# Patient Record
Sex: FEMALE | Race: WHITE | NOT HISPANIC OR LATINO
[De-identification: names, ages, dates, MRNs, and addresses within clinical notes are randomized per-mention and may not be internally consistent; named-entity substitution may affect disease eponyms.]

---

## 2018-08-14 ENCOUNTER — APPOINTMENT (OUTPATIENT)
Dept: CARDIOLOGY | Facility: CLINIC | Age: 68
End: 2018-08-14
Payer: MEDICARE

## 2018-08-14 VITALS
HEART RATE: 64 BPM | HEIGHT: 63 IN | DIASTOLIC BLOOD PRESSURE: 84 MMHG | BODY MASS INDEX: 30.12 KG/M2 | SYSTOLIC BLOOD PRESSURE: 140 MMHG | WEIGHT: 170 LBS

## 2018-08-14 DIAGNOSIS — Z83.3 FAMILY HISTORY OF DIABETES MELLITUS: ICD-10-CM

## 2018-08-14 DIAGNOSIS — Z82.49 FAMILY HISTORY OF ISCHEMIC HEART DISEASE AND OTHER DISEASES OF THE CIRCULATORY SYSTEM: ICD-10-CM

## 2018-08-14 DIAGNOSIS — Z82.3 FAMILY HISTORY OF STROKE: ICD-10-CM

## 2018-08-14 DIAGNOSIS — Z83.49 FAMILY HISTORY OF OTHER ENDOCRINE, NUTRITIONAL AND METABOLIC DISEASES: ICD-10-CM

## 2018-08-14 PROCEDURE — 99204 OFFICE O/P NEW MOD 45 MIN: CPT

## 2018-08-16 PROCEDURE — 93224 XTRNL ECG REC UP TO 48 HRS: CPT

## 2018-08-20 ENCOUNTER — APPOINTMENT (OUTPATIENT)
Dept: CARDIOLOGY | Facility: CLINIC | Age: 68
End: 2018-08-20

## 2018-08-20 ENCOUNTER — APPOINTMENT (OUTPATIENT)
Dept: CARDIOLOGY | Facility: CLINIC | Age: 68
End: 2018-08-20
Payer: MEDICARE

## 2018-08-20 PROCEDURE — 93306 TTE W/DOPPLER COMPLETE: CPT

## 2018-09-04 ENCOUNTER — APPOINTMENT (OUTPATIENT)
Dept: CARDIOLOGY | Facility: CLINIC | Age: 68
End: 2018-09-04
Payer: MEDICARE

## 2018-09-04 ENCOUNTER — APPOINTMENT (OUTPATIENT)
Dept: CARDIOLOGY | Facility: CLINIC | Age: 68
End: 2018-09-04

## 2018-09-04 VITALS
OXYGEN SATURATION: 99 % | SYSTOLIC BLOOD PRESSURE: 140 MMHG | BODY MASS INDEX: 30.12 KG/M2 | WEIGHT: 170 LBS | HEIGHT: 63 IN | HEART RATE: 79 BPM | DIASTOLIC BLOOD PRESSURE: 66 MMHG

## 2018-09-04 PROCEDURE — 99214 OFFICE O/P EST MOD 30 MIN: CPT

## 2018-09-11 ENCOUNTER — APPOINTMENT (OUTPATIENT)
Dept: CARDIOLOGY | Facility: CLINIC | Age: 68
End: 2018-09-11
Payer: MEDICARE

## 2018-09-11 VITALS
HEART RATE: 62 BPM | HEIGHT: 63 IN | DIASTOLIC BLOOD PRESSURE: 80 MMHG | WEIGHT: 170 LBS | SYSTOLIC BLOOD PRESSURE: 138 MMHG | BODY MASS INDEX: 30.12 KG/M2

## 2018-09-11 PROCEDURE — 93015 CV STRESS TEST SUPVJ I&R: CPT

## 2018-09-11 PROCEDURE — 99214 OFFICE O/P EST MOD 30 MIN: CPT | Mod: 25

## 2018-10-08 ENCOUNTER — APPOINTMENT (OUTPATIENT)
Dept: CARDIOLOGY | Facility: CLINIC | Age: 68
End: 2018-10-08
Payer: MEDICARE

## 2018-10-08 PROCEDURE — 93351 STRESS TTE COMPLETE: CPT

## 2018-11-06 ENCOUNTER — APPOINTMENT (OUTPATIENT)
Dept: CARDIOLOGY | Facility: CLINIC | Age: 68
End: 2018-11-06
Payer: MEDICARE

## 2018-11-06 VITALS
HEART RATE: 82 BPM | SYSTOLIC BLOOD PRESSURE: 180 MMHG | BODY MASS INDEX: 30.12 KG/M2 | WEIGHT: 170 LBS | OXYGEN SATURATION: 97 % | HEIGHT: 63 IN | DIASTOLIC BLOOD PRESSURE: 84 MMHG

## 2018-11-06 PROCEDURE — 99214 OFFICE O/P EST MOD 30 MIN: CPT

## 2018-12-18 ENCOUNTER — APPOINTMENT (OUTPATIENT)
Dept: CARDIOLOGY | Facility: CLINIC | Age: 68
End: 2018-12-18
Payer: MEDICARE

## 2018-12-18 VITALS
WEIGHT: 170 LBS | BODY MASS INDEX: 30.12 KG/M2 | HEIGHT: 63 IN | OXYGEN SATURATION: 98 % | DIASTOLIC BLOOD PRESSURE: 76 MMHG | SYSTOLIC BLOOD PRESSURE: 164 MMHG | HEART RATE: 70 BPM

## 2018-12-18 PROCEDURE — 99214 OFFICE O/P EST MOD 30 MIN: CPT

## 2018-12-18 NOTE — HISTORY OF PRESENT ILLNESS
[FreeTextEntry1] : Evelina is a pleasant 68-year-old female who is slightly overweight, history of fatty liver and gallstones, recent right upper quadrant abdominal pain with cholelithiasis, HTN. She status post cholecystectomy.\par \par Her blood pressure is increased  today at rest. With ETT, it increased significantly.. She did have hypertensive urgency postoperatively in [facility] during recovery ( she believes it was secondary to inadvertent Demerol administration to which she is allergic).  The systolic blood pressure corresponds to a blood pressure machine. \par \par She is quite active and does not have exertional chest pain or shortness of breath.  History of rheumatic disease as a child? She has known about her systolic murmur for several decades\par \par No history of palpitations, dizziness or syncope. Her baseline EKG showed  PVC. Holter recording showed occasional multifocal PVCs. \par \par

## 2018-12-18 NOTE — DISCUSSION/SUMMARY
[FreeTextEntry1] : PLAN:\par Labile hypertension: On ETT , systolic blood pressure exceeded 180 mmHg. Her baseline blood pressures at rest today is elevated on  Toprol-XL 25 mg. Side effects were discussed. Keep blood pressure log; Increase Toprol to 50 mg per day.\par \par One ETT, she had equivocal ST changes. No angina. She did have shortness of breath on exertion. Stress echocardiogram Results were discussed in detail. She probably has false positive EKG changes but no ischemia. Accuracy of this by stress echocardiogram was discussed.\par \par PVCs: Mostly asymptomatic. Holter recording Showed occasional multifocal PVCs.\par \par Also PVC and arrhythmias. Asymptomatic. \par \par Fasting lipid profile - I gave her a lab slip.\par \par Sections of this transcription were entered using Voice Recognition Software. There may be an error with phonetically similar sounding words. Please call me if there are any questions re the content.\par \par \par Sincerely,\par Jorge Rich MD, FACC, [place]

## 2018-12-18 NOTE — ASSESSMENT
[FreeTextEntry1] : Review Today\par \par - EKG 08/13/18: Sinus rhythm. PVC.\par - Labs July 2018: Normal LFT. Normal creatinine. FBS 97.\par - Ultrasound abdomen July 2018: Fatty liver, and uncomplicated cholelithiasis.\par - ETT 09/11/18: Exercise 6:47 minutes on Alfredo protocol. No angina. Equivocal EKG changes.\par - Stress echocardiogram October 2018: Exercise 7:33 minutes on Alfredo protocol. There were EKG changes but stress echocardiogram was negative for ischemia by wall motion. PASP increase to 54\par

## 2018-12-18 NOTE — PHYSICAL EXAM
[General Appearance - Well Developed] : well developed [Normal Appearance] : normal appearance [Well Groomed] : well groomed [General Appearance - Well Nourished] : well nourished [No Deformities] : no deformities [General Appearance - In No Acute Distress] : no acute distress [Normal Conjunctiva] : the conjunctiva exhibited no abnormalities [Eyelids - No Xanthelasma] : the eyelids demonstrated no xanthelasmas [Normal Oral Mucosa] : normal oral mucosa [No Oral Pallor] : no oral pallor [No Oral Cyanosis] : no oral cyanosis [Respiration, Rhythm And Depth] : normal respiratory rhythm and effort [Exaggerated Use Of Accessory Muscles For Inspiration] : no accessory muscle use [Auscultation Breath Sounds / Voice Sounds] : lungs were clear to auscultation bilaterally [Heart Rate And Rhythm] : heart rate and rhythm were normal [Heart Sounds] : normal S1 and S2 [Edema] : no peripheral edema present [Abdomen Soft] : soft [Abdomen Tenderness] : non-tender [Abnormal Walk] : normal gait [Gait - Sufficient For Exercise Testing] : the gait was sufficient for exercise testing [Nail Clubbing] : no clubbing of the fingernails [Cyanosis, Localized] : no localized cyanosis [Skin Color & Pigmentation] : normal skin color and pigmentation [Skin Turgor] : normal skin turgor [] : no rash [Oriented To Time, Place, And Person] : oriented to person, place, and time [Affect] : the affect was normal [Mood] : the mood was normal [Memory Recent] : recent memory was not impaired [No Anxiety] : not feeling anxious [FreeTextEntry1] : Obese

## 2019-01-14 ENCOUNTER — RESULT REVIEW (OUTPATIENT)
Age: 69
End: 2019-01-14

## 2019-06-04 ENCOUNTER — RX RENEWAL (OUTPATIENT)
Age: 69
End: 2019-06-04

## 2019-07-09 ENCOUNTER — APPOINTMENT (OUTPATIENT)
Dept: CARDIOLOGY | Facility: CLINIC | Age: 69
End: 2019-07-09
Payer: MEDICARE

## 2019-07-09 VITALS
DIASTOLIC BLOOD PRESSURE: 70 MMHG | BODY MASS INDEX: 30.83 KG/M2 | OXYGEN SATURATION: 99 % | SYSTOLIC BLOOD PRESSURE: 134 MMHG | HEIGHT: 63 IN | HEART RATE: 70 BPM | WEIGHT: 174 LBS

## 2019-07-09 PROCEDURE — 99214 OFFICE O/P EST MOD 30 MIN: CPT

## 2019-07-09 RX ORDER — ESTRADIOL 2 MG/1
2 TABLET ORAL
Refills: 0 | Status: DISCONTINUED | COMMUNITY
End: 2019-07-09

## 2019-07-09 NOTE — DISCUSSION/SUMMARY
[FreeTextEntry1] : PLAN:\par Labile hypertension: On ETT , systolic blood pressure exceeded 180 mmHg. Her baseline blood pressures at rest today is OK.  Keep blood pressure log; Increased Toprol to 50 mg per day which she is tolerating.\par \par On  ETT, she had equivocal ST changes. No angina. She did have shortness of breath on exertion. Stress echocardiogram Results were discussed in detail. She probably has false positive EKG changes but no ischemia. Accuracy of this by stress echocardiogram was discussed.\par \par PVCs: Mostly asymptomatic. Holter recording Showed occasional multifocal PVCs.\par \par Also PVC and arrhythmias. Asymptomatic. \par \par Fasting lipid profile - Marked elevation of LDL. Start Lipitor 40 mg daily. Check LFT, CK and LDL in one month. Lab slip given to the patient. Side effects discussed. If any, she'll call us.\par \par \par \par Sincerely,\par Jorge Rich MD, FACC, [place]

## 2019-07-09 NOTE — PHYSICAL EXAM
[General Appearance - Well Developed] : well developed [Normal Appearance] : normal appearance [Well Groomed] : well groomed [General Appearance - Well Nourished] : well nourished [No Deformities] : no deformities [General Appearance - In No Acute Distress] : no acute distress [Normal Conjunctiva] : the conjunctiva exhibited no abnormalities [Eyelids - No Xanthelasma] : the eyelids demonstrated no xanthelasmas [Normal Oral Mucosa] : normal oral mucosa [No Oral Pallor] : no oral pallor [No Oral Cyanosis] : no oral cyanosis [Respiration, Rhythm And Depth] : normal respiratory rhythm and effort [Exaggerated Use Of Accessory Muscles For Inspiration] : no accessory muscle use [Auscultation Breath Sounds / Voice Sounds] : lungs were clear to auscultation bilaterally [Heart Rate And Rhythm] : heart rate and rhythm were normal [Heart Sounds] : normal S1 and S2 [Edema] : no peripheral edema present [Abdomen Soft] : soft [Abdomen Tenderness] : non-tender [Abnormal Walk] : normal gait [Gait - Sufficient For Exercise Testing] : the gait was sufficient for exercise testing [Nail Clubbing] : no clubbing of the fingernails [Cyanosis, Localized] : no localized cyanosis [Skin Color & Pigmentation] : normal skin color and pigmentation [Skin Turgor] : normal skin turgor [] : no rash [Oriented To Time, Place, And Person] : oriented to person, place, and time [Affect] : the affect was normal [Mood] : the mood was normal [No Anxiety] : not feeling anxious [Memory Recent] : recent memory was not impaired [FreeTextEntry1] : Obese

## 2019-07-09 NOTE — ASSESSMENT
[FreeTextEntry1] : Review Today\par \par - EKG 08/13/18: Sinus rhythm. PVC.\par - Labs July 2018: Normal LFT. Normal creatinine. FBS 97.\par - Ultrasound abdomen July 2018: Fatty liver, and uncomplicated cholelithiasis.\par - ETT 09/11/18: Exercise 6:47 minutes on Alfredo protocol. No angina. Equivocal EKG changes.\par - Stress echocardiogram October 2018: Exercise 7:33 minutes on Alfredo protocol. There were EKG changes but stress echocardiogram was negative for ischemia by wall motion. PASP increase to 54\par - Labs June 2019: . HDL 81. Triglycerides 168\par

## 2019-07-09 NOTE — HISTORY OF PRESENT ILLNESS
[FreeTextEntry1] : Evelina is a pleasant 68-year-old female who is slightly overweight, history of fatty liver and gallstones,  HTN. She status post cholecystectomy.\par \par Her blood pressure is OK at rest. With ETT, it increased significantly.. She did have hypertensive urgency postoperatively in [facility] during recovery ( she believes it was secondary to inadvertent Demerol administration to which she is allergic).  \par \par She is quite active and does not have exertional chest pain or shortness of breath.  History of rheumatic disease as a child? She has known about her systolic murmur for several decades\par \par No history of palpitations, dizziness or syncope. Her baseline EKG showed  PVC. Holter recording showed occasional multifocal PVCs. \par \par

## 2020-02-04 ENCOUNTER — APPOINTMENT (OUTPATIENT)
Dept: CARDIOLOGY | Facility: CLINIC | Age: 70
End: 2020-02-04
Payer: MEDICARE

## 2020-02-04 ENCOUNTER — NON-APPOINTMENT (OUTPATIENT)
Age: 70
End: 2020-02-04

## 2020-02-04 VITALS
WEIGHT: 170 LBS | DIASTOLIC BLOOD PRESSURE: 66 MMHG | SYSTOLIC BLOOD PRESSURE: 144 MMHG | HEIGHT: 63 IN | OXYGEN SATURATION: 97 % | BODY MASS INDEX: 30.12 KG/M2 | HEART RATE: 77 BPM

## 2020-02-04 DIAGNOSIS — Z87.891 PERSONAL HISTORY OF NICOTINE DEPENDENCE: ICD-10-CM

## 2020-02-04 PROCEDURE — 93000 ELECTROCARDIOGRAM COMPLETE: CPT

## 2020-02-04 PROCEDURE — 99214 OFFICE O/P EST MOD 30 MIN: CPT

## 2020-02-04 NOTE — PHYSICAL EXAM
[General Appearance - Well Developed] : well developed [Well Groomed] : well groomed [Normal Appearance] : normal appearance [General Appearance - Well Nourished] : well nourished [General Appearance - In No Acute Distress] : no acute distress [No Deformities] : no deformities [Eyelids - No Xanthelasma] : the eyelids demonstrated no xanthelasmas [Normal Oral Mucosa] : normal oral mucosa [Normal Conjunctiva] : the conjunctiva exhibited no abnormalities [No Oral Cyanosis] : no oral cyanosis [No Oral Pallor] : no oral pallor [Respiration, Rhythm And Depth] : normal respiratory rhythm and effort [Exaggerated Use Of Accessory Muscles For Inspiration] : no accessory muscle use [Heart Sounds] : normal S1 and S2 [Auscultation Breath Sounds / Voice Sounds] : lungs were clear to auscultation bilaterally [Heart Rate And Rhythm] : heart rate and rhythm were normal [Abdomen Soft] : soft [Edema] : no peripheral edema present [Abdomen Tenderness] : non-tender [Nail Clubbing] : no clubbing of the fingernails [Abnormal Walk] : normal gait [Cyanosis, Localized] : no localized cyanosis [Gait - Sufficient For Exercise Testing] : the gait was sufficient for exercise testing [Skin Turgor] : normal skin turgor [] : no rash [Skin Color & Pigmentation] : normal skin color and pigmentation [Oriented To Time, Place, And Person] : oriented to person, place, and time [Affect] : the affect was normal [No Anxiety] : not feeling anxious [Mood] : the mood was normal [Memory Recent] : recent memory was not impaired [FreeTextEntry1] : Short systolic murmur over left parastenal region

## 2020-02-04 NOTE — DISCUSSION/SUMMARY
[FreeTextEntry1] : PLAN:\par Labile hypertension: On ETT , systolic blood pressure exceeded 180 mmHg.  Keep blood pressure log; Increased Toprol to 50 mg per day which she is tolerating.  Add valsartan.  Check BMP.\par \par On  ETT, she had equivocal ST changes. No angina. She did have shortness of breath on exertion. Stress echocardiogram Results were discussed in detail. She probably has false positive EKG changes but no ischemia. Accuracy of this by stress echocardiogram was discussed.\par \par PVCs: Mostly asymptomatic. Holter recording Showed occasional multifocal PVCs.\par \par Also PVC and arrhythmias. Asymptomatic. \par \par Fasting lipid profile - Marked elevation of LDL. on Lipitor 40 mg daily.  He is tolerating Lipitor without side effects.  LDL came down to 90.\par \par Follow-up in 6 months.\par \par \par Sincerely,\par Jorge Rich MD, FACC, [place]

## 2020-02-19 ENCOUNTER — APPOINTMENT (OUTPATIENT)
Dept: RADIOLOGY | Facility: CLINIC | Age: 70
End: 2020-02-19
Payer: MEDICARE

## 2020-02-19 PROCEDURE — 74240 X-RAY XM UPR GI TRC 1CNTRST: CPT

## 2020-03-02 RX ORDER — VALSARTAN 80 MG/1
80 TABLET, COATED ORAL DAILY
Qty: 90 | Refills: 0 | Status: DISCONTINUED | COMMUNITY
Start: 2020-02-04 | End: 2020-03-02

## 2020-03-26 ENCOUNTER — OUTPATIENT (OUTPATIENT)
Dept: OUTPATIENT SERVICES | Facility: HOSPITAL | Age: 70
LOS: 1 days | End: 2020-03-26

## 2020-05-26 ENCOUNTER — RX RENEWAL (OUTPATIENT)
Age: 70
End: 2020-05-26

## 2020-07-29 ENCOUNTER — APPOINTMENT (OUTPATIENT)
Dept: MAMMOGRAPHY | Facility: CLINIC | Age: 70
End: 2020-07-29
Payer: MEDICARE

## 2020-07-29 PROCEDURE — 77067 SCR MAMMO BI INCL CAD: CPT

## 2020-07-29 PROCEDURE — 77063 BREAST TOMOSYNTHESIS BI: CPT

## 2020-08-25 ENCOUNTER — APPOINTMENT (OUTPATIENT)
Dept: CARDIOLOGY | Facility: CLINIC | Age: 70
End: 2020-08-25
Payer: MEDICARE

## 2020-08-25 VITALS
TEMPERATURE: 98.4 F | DIASTOLIC BLOOD PRESSURE: 60 MMHG | HEIGHT: 63 IN | HEART RATE: 64 BPM | SYSTOLIC BLOOD PRESSURE: 124 MMHG | WEIGHT: 170 LBS | BODY MASS INDEX: 30.12 KG/M2 | OXYGEN SATURATION: 99 %

## 2020-08-25 PROCEDURE — 99214 OFFICE O/P EST MOD 30 MIN: CPT

## 2020-08-25 RX ORDER — ASPIRIN ENTERIC COATED TABLETS 81 MG 81 MG/1
81 TABLET, DELAYED RELEASE ORAL DAILY
Qty: 90 | Refills: 3 | Status: ACTIVE | COMMUNITY
Start: 1900-01-01 | End: 1900-01-01

## 2020-08-25 NOTE — PHYSICAL EXAM
[Well Groomed] : well groomed [Normal Appearance] : normal appearance [General Appearance - Well Developed] : well developed [General Appearance - Well Nourished] : well nourished [No Deformities] : no deformities [General Appearance - In No Acute Distress] : no acute distress [Normal Conjunctiva] : the conjunctiva exhibited no abnormalities [Eyelids - No Xanthelasma] : the eyelids demonstrated no xanthelasmas [Normal Oral Mucosa] : normal oral mucosa [No Oral Cyanosis] : no oral cyanosis [No Oral Pallor] : no oral pallor [Auscultation Breath Sounds / Voice Sounds] : lungs were clear to auscultation bilaterally [Respiration, Rhythm And Depth] : normal respiratory rhythm and effort [Exaggerated Use Of Accessory Muscles For Inspiration] : no accessory muscle use [Heart Sounds] : normal S1 and S2 [Heart Rate And Rhythm] : heart rate and rhythm were normal [Murmurs] : no murmurs present [Edema] : no peripheral edema present [Abdomen Soft] : soft [Abdomen Tenderness] : non-tender [FreeTextEntry1] : Obese [Abnormal Walk] : normal gait [Gait - Sufficient For Exercise Testing] : the gait was sufficient for exercise testing [Cyanosis, Localized] : no localized cyanosis [Nail Clubbing] : no clubbing of the fingernails [Skin Color & Pigmentation] : normal skin color and pigmentation [] : no rash [Skin Turgor] : normal skin turgor [Oriented To Time, Place, And Person] : oriented to person, place, and time [Affect] : the affect was normal [Mood] : the mood was normal [No Anxiety] : not feeling anxious [Memory Recent] : recent memory was not impaired

## 2020-08-25 NOTE — DISCUSSION/SUMMARY
[FreeTextEntry1] : PLAN:\par Labile hypertension: On ETT , systolic blood pressure exceeded 180 mmHg. Her baseline blood pressures at rest today is OK.  Keep blood pressure log; Increased Toprol to 50 mg per day which she is tolerating.\par \par On  ETT, she had equivocal ST changes. No angina. She did have shortness of breath on exertion. Stress echocardiogram Results were discussed in detail. She probably has false positive EKG changes but no ischemia. Accuracy of this by stress echocardiogram was discussed.\par \par PVCs: Mostly asymptomatic. Holter recording Showed occasional multifocal PVCs.  Repeat Holter recording in future\par \par Also PVC and arrhythmias. Asymptomatic.  Evaluate PVC burden with Holter in future\par \par Fasting lipid profile -the past she had Marked elevation of LDL. Started Lipitor 40 mg daily.  LDL declined significantly to 94.  CK and LFT are normal.  No side effects.\par \par Follow up in 6 months\par \par Sincerely,\par Jorge Rich MD, FACC, [place]

## 2020-08-25 NOTE — HISTORY OF PRESENT ILLNESS
[FreeTextEntry1] : Evelina is a pleasant 69-year-old female who is overweight, history of fatty liver and gallstones,  HTN. She status post cholecystectomy.\par \par Her blood pressure is OK at rest. With ETT, it increased significantly.. She did have hypertensive urgency postoperatively in [facility] during recovery ( she believes it was secondary to inadvertent Demerol administration to which she is allergic).  \par \par She is quite active and does not have exertional chest pain or shortness of breath.  History of rheumatic disease as a child? She has known about her systolic murmur for several decades\par \par No history of palpitations, dizziness or syncope. Her baseline EKG showed  PVC. Holter recording showed occasional multifocal PVCs. \par \par

## 2020-08-25 NOTE — ASSESSMENT
[FreeTextEntry1] : Review Today\par \par - EKG 08/13/18: Sinus rhythm. PVC.\par - Labs July 2018: Normal LFT. Normal creatinine. FBS 97.\par - Ultrasound abdomen July 2018: Fatty liver, and uncomplicated cholelithiasis.\par - ETT 09/11/18: Exercise 6:47 minutes on Alfredo protocol. No angina. Equivocal EKG changes.\par - Stress echocardiogram October 2018: Exercise 7:33 minutes on Alfredo protocol. There were EKG changes but stress echocardiogram was negative for ischemia by wall motion. PASP increase to 54\par - Labs June 2019: . HDL 81. Triglycerides 168\par -Labs February 2020: LDL 94.  FBS 99.  Triglycerides 178.  Normal LFT and creatinine.  Normal CPK\par

## 2021-02-08 ENCOUNTER — APPOINTMENT (OUTPATIENT)
Dept: CARDIOLOGY | Facility: CLINIC | Age: 71
End: 2021-02-08
Payer: MEDICARE

## 2021-02-11 PROCEDURE — 93224 XTRNL ECG REC UP TO 48 HRS: CPT

## 2021-02-16 ENCOUNTER — APPOINTMENT (OUTPATIENT)
Dept: CARDIOLOGY | Facility: CLINIC | Age: 71
End: 2021-02-16
Payer: MEDICARE

## 2021-02-16 VITALS
RESPIRATION RATE: 16 BRPM | WEIGHT: 170 LBS | BODY MASS INDEX: 30.12 KG/M2 | OXYGEN SATURATION: 98 % | TEMPERATURE: 97.4 F | HEART RATE: 82 BPM | DIASTOLIC BLOOD PRESSURE: 78 MMHG | HEIGHT: 63 IN | SYSTOLIC BLOOD PRESSURE: 138 MMHG

## 2021-02-16 PROCEDURE — 99214 OFFICE O/P EST MOD 30 MIN: CPT

## 2021-02-16 RX ORDER — PANTOPRAZOLE 40 MG/1
40 TABLET, DELAYED RELEASE ORAL
Qty: 90 | Refills: 0 | Status: ACTIVE | COMMUNITY
Start: 2020-03-27

## 2021-02-16 NOTE — ASSESSMENT
[FreeTextEntry1] : Review Today\par \par - EKG 08/13/18: Sinus rhythm. PVC.\par - Labs July 2018: Normal LFT. Normal creatinine. FBS 97.\par - Ultrasound abdomen July 2018: Fatty liver, and uncomplicated cholelithiasis.\par - ETT 09/11/18: Exercise 6:47 minutes on Alfredo protocol. No angina. Equivocal EKG changes.\par - Stress echocardiogram October 2018: Exercise 7:33 minutes on Alfredo protocol. There were EKG changes but stress echocardiogram was negative for ischemia by wall motion. PASP increase to 54\par - Labs June 2019: . HDL 81. Triglycerides 168\par -Labs February 2020: LDL 94.  FBS 99.  Triglycerides 178.  Normal LFT and creatinine.  Normal CPK\par -Holter recording February 2021: Average heart rate 66.  Large amounts of artifact.  Rare PACs.  Asymptomatic ectopic atrial tachycardia 11 beats.  Rare PVCs.\par

## 2021-02-16 NOTE — PHYSICAL EXAM
[General Appearance - Well Developed] : well developed [Normal Appearance] : normal appearance [Well Groomed] : well groomed [General Appearance - Well Nourished] : well nourished [No Deformities] : no deformities [General Appearance - In No Acute Distress] : no acute distress [Normal Conjunctiva] : the conjunctiva exhibited no abnormalities [Eyelids - No Xanthelasma] : the eyelids demonstrated no xanthelasmas [Normal Oral Mucosa] : normal oral mucosa [No Oral Pallor] : no oral pallor [No Oral Cyanosis] : no oral cyanosis [Respiration, Rhythm And Depth] : normal respiratory rhythm and effort [Exaggerated Use Of Accessory Muscles For Inspiration] : no accessory muscle use [Heart Rate And Rhythm] : heart rate and rhythm were normal [Auscultation Breath Sounds / Voice Sounds] : lungs were clear to auscultation bilaterally [Heart Sounds] : normal S1 and S2 [Murmurs] : no murmurs present [Edema] : no peripheral edema present [Abdomen Soft] : soft [Abdomen Tenderness] : non-tender [FreeTextEntry1] : Obese [Abnormal Walk] : normal gait [Gait - Sufficient For Exercise Testing] : the gait was sufficient for exercise testing [Nail Clubbing] : no clubbing of the fingernails [Cyanosis, Localized] : no localized cyanosis [Skin Color & Pigmentation] : normal skin color and pigmentation [Skin Turgor] : normal skin turgor [] : no rash [Oriented To Time, Place, And Person] : oriented to person, place, and time [Affect] : the affect was normal [Mood] : the mood was normal [Memory Recent] : recent memory was not impaired [No Anxiety] : not feeling anxious

## 2021-02-16 NOTE — HISTORY OF PRESENT ILLNESS
[FreeTextEntry1] : Evelina is a pleasant 70-year-old female who is overweight, history of fatty liver and gallstones,  HTN. She status post cholecystectomy.\par \par Her blood pressure is OK at rest. With ETT, it increased significantly.. She did have hypertensive urgency postoperatively in [facility] during recovery ( she believes it was secondary to inadvertent Demerol administration to which she is allergic).  \par \par She is quite active and does not have exertional chest pain or shortness of breath.  History of rheumatic disease as a child? She has known about her systolic murmur for several decades\par \par No history of palpitations, dizziness or syncope. Her baseline EKG showed  PVC. Holter recording showed occasional multifocal PVCs. \par \par

## 2021-02-16 NOTE — DISCUSSION/SUMMARY
[FreeTextEntry1] : PLAN:\par Labile hypertension: On ETT , systolic blood pressure exceeded 180 mmHg. Her baseline blood pressures at rest today is OK.  Keep blood pressure log; continue  Toprol to 50 mg per day which she is tolerating.\par \par On  ETT, she had equivocal ST changes. No angina. She did have shortness of breath on exertion. Stress echocardiogram Results were discussed in detail. She probably has false positive EKG changes but no ischemia. Accuracy of this by stress echocardiogram was discussed.\par \par PVCs: Mostly asymptomatic. Holter recording Showed occasional multifocal PVCs.  Repeat Holter recording was limited by artifacts\par \par Fasting lipid profile -the past she had Marked elevation of LDL. Started Lipitor 40 mg daily.  LDL declined significantly to 94.  CK and LFT are normal.  No side effects.  Check fasting lipid profile.\par \par Follow up in 9 months\par \par Sincerely,\par Jorge Rich MD, FACC, [place]

## 2021-07-22 ENCOUNTER — APPOINTMENT (OUTPATIENT)
Dept: MAMMOGRAPHY | Facility: CLINIC | Age: 71
End: 2021-07-22
Payer: MEDICARE

## 2021-07-22 PROCEDURE — 77063 BREAST TOMOSYNTHESIS BI: CPT

## 2021-07-22 PROCEDURE — 77067 SCR MAMMO BI INCL CAD: CPT

## 2021-07-23 ENCOUNTER — APPOINTMENT (OUTPATIENT)
Dept: RADIOLOGY | Facility: CLINIC | Age: 71
End: 2021-07-23
Payer: MEDICARE

## 2021-07-23 PROCEDURE — 77080 DXA BONE DENSITY AXIAL: CPT

## 2021-08-12 ENCOUNTER — APPOINTMENT (OUTPATIENT)
Dept: CARDIOLOGY | Facility: CLINIC | Age: 71
End: 2021-08-12
Payer: MEDICARE

## 2021-08-12 ENCOUNTER — NON-APPOINTMENT (OUTPATIENT)
Age: 71
End: 2021-08-12

## 2021-08-12 VITALS
SYSTOLIC BLOOD PRESSURE: 122 MMHG | TEMPERATURE: 97.8 F | WEIGHT: 170 LBS | HEART RATE: 61 BPM | BODY MASS INDEX: 30.11 KG/M2 | OXYGEN SATURATION: 98 % | DIASTOLIC BLOOD PRESSURE: 66 MMHG | RESPIRATION RATE: 15 BRPM

## 2021-08-12 PROCEDURE — 93000 ELECTROCARDIOGRAM COMPLETE: CPT | Mod: NC

## 2021-08-12 PROCEDURE — 99214 OFFICE O/P EST MOD 30 MIN: CPT

## 2021-08-12 NOTE — DISCUSSION/SUMMARY
[FreeTextEntry1] : PLAN:\par Labile hypertension:  Well controlled in office.  Continue current meds. \par \par Hyperlipidemia: Tolerating statin therapy.  LDL: 94 on labs 3/21.  Continue. \par \par Preop:\par At present, there are no active cardiac conditions. \par No recent unstable coronary syndromes, decompensated heart failure, severe valvular heart disease or significant dysrhythmias. \par Baseline functional status is acceptable. \par The clinical benefit of the proposed procedure outweighs the associated cardiovascular risk. \par Risk not attenuated with further CV testing. \par Prior testing as outlined above.\par Optimized from a cardiovascular perspective. \par Pt had episode of hypertensive urgency with Demerol administration.  Would avoid. \par \par Follow up with Dr. Rich as scheduled.

## 2021-08-12 NOTE — ASSESSMENT
[FreeTextEntry1] : Reviewed previously\par \par - EKG 08/13/18: Sinus rhythm. PVC.\par - Labs July 2018: Normal LFT. Normal creatinine. FBS 97.\par - Ultrasound abdomen July 2018: Fatty liver, and uncomplicated cholelithiasis.\par - ETT 09/11/18: Exercise 6:47 minutes on Alfredo protocol. No angina. Equivocal EKG changes.\par - Stress echocardiogram October 2018: Exercise 7:33 minutes on Alfredo protocol. There were EKG changes but stress echocardiogram was negative for ischemia by wall motion. PASP increase to 54\par - Labs June 2019: . HDL 81. Triglycerides 168\par -Labs February 2020: LDL 94.  FBS 99.  Triglycerides 178.  Normal LFT and creatinine.  Normal CPK\par -Holter recording February 2021: Average heart rate 66.  Large amounts of artifact.  Rare PACs.  Asymptomatic ectopic atrial tachycardia 11 beats.  Rare PVCs.\par

## 2021-08-12 NOTE — HISTORY OF PRESENT ILLNESS
[FreeTextEntry1] : Evelina is a pleasant 70-year-old female who is overweight, history of fatty liver and gallstones,  HTN. She status post cholecystectomy.\par \par Her blood pressure is OK at rest. With ETT, it increased significantly.. She did have hypertensive urgency postoperatively in [facility] during recovery ( she believes it was secondary to inadvertent Demerol administration to which she is allergic).  \par \par She is quite active and does not have exertional chest pain or shortness of breath.  History of rheumatic disease as a child? She has known about her systolic murmur for several decades\par \par No history of palpitations, dizziness or syncope. Her baseline EKG showed  PVC. Holter recording showed occasional multifocal PVCs. \par \par She presents requesting preop clearance for the procedure as noted above.  EKG: NSR.  \par BP on my exam 114/58mmHg. \par \par

## 2021-08-12 NOTE — REASON FOR VISIT
[Follow-Up - Clinic] : a clinic follow-up of [FreeTextEntry2] : Preop evaluation for b/l cataract surgery with Dr. Morillo at [facility] on 8/26/21 and 9/9/21

## 2021-08-12 NOTE — PHYSICAL EXAM
[General Appearance - Well Developed] : well developed [Normal Appearance] : normal appearance [Well Groomed] : well groomed [General Appearance - Well Nourished] : well nourished [No Deformities] : no deformities [General Appearance - In No Acute Distress] : no acute distress [Normal Conjunctiva] : the conjunctiva exhibited no abnormalities [Eyelids - No Xanthelasma] : the eyelids demonstrated no xanthelasmas [Normal Oral Mucosa] : normal oral mucosa [No Oral Pallor] : no oral pallor [No Oral Cyanosis] : no oral cyanosis [Respiration, Rhythm And Depth] : normal respiratory rhythm and effort [Exaggerated Use Of Accessory Muscles For Inspiration] : no accessory muscle use [Auscultation Breath Sounds / Voice Sounds] : lungs were clear to auscultation bilaterally [Heart Rate And Rhythm] : heart rate and rhythm were normal [Heart Sounds] : normal S1 and S2 [Murmurs] : no murmurs present [Edema] : no peripheral edema present [Abdomen Soft] : soft [Abdomen Tenderness] : non-tender [Abnormal Walk] : normal gait [Gait - Sufficient For Exercise Testing] : the gait was sufficient for exercise testing [Nail Clubbing] : no clubbing of the fingernails [Cyanosis, Localized] : no localized cyanosis [Skin Color & Pigmentation] : normal skin color and pigmentation [Skin Turgor] : normal skin turgor [] : no rash [Oriented To Time, Place, And Person] : oriented to person, place, and time [Affect] : the affect was normal [Mood] : the mood was normal [Memory Recent] : recent memory was not impaired [No Anxiety] : not feeling anxious [FreeTextEntry1] : Obese

## 2021-11-29 ENCOUNTER — APPOINTMENT (OUTPATIENT)
Dept: CARDIOLOGY | Facility: CLINIC | Age: 71
End: 2021-11-29
Payer: MEDICARE

## 2021-11-29 VITALS
WEIGHT: 170 LBS | HEIGHT: 63 IN | HEART RATE: 69 BPM | DIASTOLIC BLOOD PRESSURE: 62 MMHG | BODY MASS INDEX: 30.12 KG/M2 | TEMPERATURE: 96.6 F | OXYGEN SATURATION: 99 % | SYSTOLIC BLOOD PRESSURE: 128 MMHG

## 2021-11-29 PROCEDURE — 99213 OFFICE O/P EST LOW 20 MIN: CPT

## 2021-11-29 NOTE — DISCUSSION/SUMMARY
[FreeTextEntry1] : PLAN:\par Labile hypertension:  Well controlled in office.  Continue current meds. \par \par Hyperlipidemia: Tolerating statin therapy.  LDL: 94 on labs 3/21.  Continue Lipitor. \par \par Avoid Demerol in future (suspect hypertensive blood pressure response to it)\par \par Exercise and weight loss recommended\par \par Follow-up in 6 months

## 2021-11-29 NOTE — REASON FOR VISIT
[Follow-Up - Clinic] : a clinic follow-up of [FreeTextEntry2] : Preop evaluation for b/l cataract surgery with Dr. Morillo at [facility] on 8/26/21 and 9/9/21

## 2021-11-29 NOTE — HISTORY OF PRESENT ILLNESS
[FreeTextEntry1] : Evelina is a pleasant 71-year-old female who is overweight, history of fatty liver and gallstones,  HTN. She status post cholecystectomy.\par \par Her blood pressure is controlled. She did have hypertensive urgency postoperatively in [facility] during recovery ( she believes it was secondary to inadvertent Demerol administration to which she is allergic).  \par \par She is quite active and does not have exertional chest pain or shortness of breath.  History of rheumatic disease as a child? She has known about her systolic murmur for several decades\par \par No history of palpitations, dizziness or syncope. Her baseline EKG showed  PVC. Holter recording showed occasional multifocal PVCs. \par \par \par

## 2021-11-29 NOTE — ASSESSMENT
[FreeTextEntry1] : Reviewed previously\par \par - EKG 08/13/18: Sinus rhythm. PVC.\par - Labs July 2018: Normal LFT. Normal creatinine. FBS 97.\par - Ultrasound abdomen July 2018: Fatty liver, and uncomplicated cholelithiasis.\par - ETT 09/11/18: Exercise 6:47 minutes on Alfredo protocol. No angina. Equivocal EKG changes.\par - Stress echocardiogram October 2018: Exercise 7:33 minutes on Alfredo protocol. There were EKG changes but stress echocardiogram was negative for ischemia by wall motion. PASP increase to 54\par - Labs June 2019: . HDL 81. Triglycerides 168\par - Labs February 2020: LDL 94.  FBS 99.  Triglycerides 178.  Normal LFT and creatinine.  Normal CPK\par - Holter recording February 2021: Average heart rate 66.  Large amounts of artifact.  Rare PACs.  Asymptomatic ectopic atrial tachycardia 11 beats.  Rare PVCs.\par

## 2021-11-29 NOTE — PHYSICAL EXAM
[General Appearance - Well Developed] : well developed [Normal Appearance] : normal appearance [Well Groomed] : well groomed [General Appearance - Well Nourished] : well nourished [No Deformities] : no deformities [General Appearance - In No Acute Distress] : no acute distress [Normal Conjunctiva] : the conjunctiva exhibited no abnormalities [Eyelids - No Xanthelasma] : the eyelids demonstrated no xanthelasmas [Normal Oral Mucosa] : normal oral mucosa [No Oral Pallor] : no oral pallor [No Oral Cyanosis] : no oral cyanosis [Respiration, Rhythm And Depth] : normal respiratory rhythm and effort [Exaggerated Use Of Accessory Muscles For Inspiration] : no accessory muscle use [Auscultation Breath Sounds / Voice Sounds] : lungs were clear to auscultation bilaterally [Heart Rate And Rhythm] : heart rate and rhythm were normal [Heart Sounds] : normal S1 and S2 [Murmurs] : no murmurs present [Edema] : no peripheral edema present [Abdomen Soft] : soft [Abdomen Tenderness] : non-tender [FreeTextEntry1] : Obese [Abnormal Walk] : normal gait [Gait - Sufficient For Exercise Testing] : the gait was sufficient for exercise testing [Nail Clubbing] : no clubbing of the fingernails [Cyanosis, Localized] : no localized cyanosis [Skin Color & Pigmentation] : normal skin color and pigmentation [Skin Turgor] : normal skin turgor [] : no rash [Oriented To Time, Place, And Person] : oriented to person, place, and time [Affect] : the affect was normal [Mood] : the mood was normal [Memory Recent] : recent memory was not impaired [No Anxiety] : not feeling anxious

## 2022-06-14 ENCOUNTER — APPOINTMENT (OUTPATIENT)
Dept: CARDIOLOGY | Facility: CLINIC | Age: 72
End: 2022-06-14
Payer: MEDICARE

## 2022-06-14 VITALS
WEIGHT: 170 LBS | RESPIRATION RATE: 14 BRPM | BODY MASS INDEX: 30.12 KG/M2 | SYSTOLIC BLOOD PRESSURE: 136 MMHG | HEIGHT: 63 IN | HEART RATE: 63 BPM | TEMPERATURE: 96.8 F | OXYGEN SATURATION: 98 % | DIASTOLIC BLOOD PRESSURE: 68 MMHG

## 2022-06-14 PROCEDURE — 99214 OFFICE O/P EST MOD 30 MIN: CPT

## 2022-06-14 RX ORDER — OFLOXACIN 3 MG/ML
0.3 SOLUTION/ DROPS OPHTHALMIC
Qty: 5 | Refills: 0 | Status: DISCONTINUED | COMMUNITY
Start: 2021-09-02 | End: 2022-06-14

## 2022-06-14 RX ORDER — PREDNISOLONE ACETATE 10 MG/ML
1 SUSPENSION/ DROPS OPHTHALMIC
Qty: 5 | Refills: 0 | Status: DISCONTINUED | COMMUNITY
Start: 2021-08-09 | End: 2022-06-14

## 2022-06-14 NOTE — DISCUSSION/SUMMARY
[FreeTextEntry1] : PLAN:\par Labile hypertension: Not ideally controlled today.  Increase Benicar to 1.5 tablets/day.\par \par Hyperlipidemia: Tolerating statin therapy.  LDL: 94 on labs 3/21.  Continue Lipitor.  Follow-up fasting lipid profile with primary care during next routine labs.\par \par Avoid Demerol in future (suspect hypertensive blood pressure response to it)\par \par Exercise and weight loss recommended\par \par Echocardiogram: Look for hypertensive changes.  \par \par Follow-up in 6 months

## 2022-06-14 NOTE — HISTORY OF PRESENT ILLNESS
[FreeTextEntry1] : Evelina is a pleasant 71-year-old female who is overweight, history of fatty liver and gallstones,  HTN. She status post cholecystectomy.\par \par Her blood pressure is borderline controlled. She did have hypertensive urgency postoperatively in [facility] during recovery ( she believes it was secondary to inadvertent Demerol administration to which she is allergic).  \par \par She is quite active and does not have exertional chest pain or shortness of breath.  History of rheumatic disease as a child? She has known about her systolic murmur for several decades.  ZIO in August 2018 showed mild MR and AR, mild TR with normal PASP and EF.\par \par No history of palpitations, dizziness or syncope. Her baseline EKG showed  PVC. Holter recording showed occasional multifocal PVCs. \par \par \par

## 2022-07-11 ENCOUNTER — NON-APPOINTMENT (OUTPATIENT)
Age: 72
End: 2022-07-11

## 2022-08-26 ENCOUNTER — NON-APPOINTMENT (OUTPATIENT)
Age: 72
End: 2022-08-26

## 2022-08-26 DIAGNOSIS — Z92.89 PERSONAL HISTORY OF OTHER MEDICAL TREATMENT: ICD-10-CM

## 2022-08-26 DIAGNOSIS — Z86.018 PERSONAL HISTORY OF OTHER BENIGN NEOPLASM: ICD-10-CM

## 2022-08-26 DIAGNOSIS — Z78.9 OTHER SPECIFIED HEALTH STATUS: ICD-10-CM

## 2022-08-26 DIAGNOSIS — Z92.29 PERSONAL HISTORY OF OTHER DRUG THERAPY: ICD-10-CM

## 2022-08-26 DIAGNOSIS — Z84.1 FAMILY HISTORY OF DISORDERS OF KIDNEY AND URETER: ICD-10-CM

## 2022-08-26 DIAGNOSIS — Z98.891 HISTORY OF UTERINE SCAR FROM PREVIOUS SURGERY: ICD-10-CM

## 2022-08-26 DIAGNOSIS — Z78.0 ASYMPTOMATIC MENOPAUSAL STATE: ICD-10-CM

## 2022-08-26 DIAGNOSIS — Z23 ENCOUNTER FOR IMMUNIZATION: ICD-10-CM

## 2022-08-26 DIAGNOSIS — Z87.42 PERSONAL HISTORY OF OTHER DISEASES OF THE FEMALE GENITAL TRACT: ICD-10-CM

## 2022-08-26 DIAGNOSIS — Z98.890 OTHER SPECIFIED POSTPROCEDURAL STATES: ICD-10-CM

## 2022-09-16 DIAGNOSIS — N95.2 POSTMENOPAUSAL ATROPHIC VAGINITIS: ICD-10-CM

## 2022-09-21 ENCOUNTER — RESULT CHARGE (OUTPATIENT)
Age: 72
End: 2022-09-21

## 2022-09-21 ENCOUNTER — APPOINTMENT (OUTPATIENT)
Dept: OBGYN | Facility: CLINIC | Age: 72
End: 2022-09-21

## 2022-09-21 ENCOUNTER — LABORATORY RESULT (OUTPATIENT)
Age: 72
End: 2022-09-21

## 2022-09-21 VITALS — WEIGHT: 170 LBS | DIASTOLIC BLOOD PRESSURE: 78 MMHG | BODY MASS INDEX: 30.11 KG/M2 | SYSTOLIC BLOOD PRESSURE: 140 MMHG

## 2022-09-21 DIAGNOSIS — R82.998 OTHER ABNORMAL FINDINGS IN URINE: ICD-10-CM

## 2022-09-21 LAB
BILIRUB UR QL STRIP: NORMAL
CLARITY UR: CLEAR
COLLECTION METHOD: NORMAL
GLUCOSE UR-MCNC: NORMAL
HCG UR QL: 0.2 EU/DL
HEMOGLOBIN: 12.1
HGB UR QL STRIP.AUTO: NORMAL
KETONES UR-MCNC: NORMAL
LEUKOCYTE ESTERASE UR QL STRIP: NORMAL
NITRITE UR QL STRIP: NORMAL
PH UR STRIP: 5.5
PROT UR STRIP-MCNC: NORMAL
SP GR UR STRIP: 1.01

## 2022-09-21 PROCEDURE — 85018 HEMOGLOBIN: CPT | Mod: QW

## 2022-09-21 PROCEDURE — G0101: CPT

## 2022-09-21 PROCEDURE — 82270 OCCULT BLOOD FECES: CPT

## 2022-09-21 PROCEDURE — 81003 URINALYSIS AUTO W/O SCOPE: CPT | Mod: QW

## 2022-09-21 PROCEDURE — 99212 OFFICE O/P EST SF 10 MIN: CPT | Mod: 25

## 2022-09-21 NOTE — PHYSICAL EXAM
[Chaperone Present] : A chaperone was present in the examining room during all aspects of the physical examination [Appropriately responsive] : appropriately responsive [Alert] : alert [No Lymphadenopathy] : no lymphadenopathy [Soft] : soft [Non-tender] : non-tender [Oriented x3] : oriented x3 [Examination Of The Breasts] : a normal appearance [No Discharge] : no discharge [No Masses] : no breast masses were palpable [Labia Majora] : normal [Labia Minora] : normal [Normal] : normal [Atrophy] : atrophy [No Bleeding] : There was no active vaginal bleeding [Absent] : absent [Uterine Adnexae] : normal [FreeTextEntry1] : Sadie CABRERA-SHAHRAM chaperoned during entire physical exam [Occult Blood Positive] : was negative for occult blood analysis

## 2022-09-21 NOTE — HISTORY OF PRESENT ILLNESS
[TextBox_4] : Patient is a 72y/o female here today for annual visit. Patient is s/p hysterectomy. On estradiol tolerating well, will continue at this time.

## 2022-09-21 NOTE — DISCUSSION/SUMMARY
[FreeTextEntry1] : Patient to follow up in 1 year for annual GYN exam\par Mammogram: now Rx given\par colonoscopy: now her apt is scheduled for next month\par Bone density due: 7/26\par \par Continue estradiol 1mg, Rx sent \par \par Pap ordered\par Hemoccult ordered \par All questions answered, patient agreeable with plan.\par

## 2022-09-21 NOTE — PLAN
[FreeTextEntry1] : Patient to follow up in 1 year for annual GYN exam\par Mammogram and bilateral breast US due:\par Colonoscopy due: \par Bone density due: 7/26\par Pap ordered\par Gc Chlamydia ordered\par Hemoccult ordered \par All questions answered, patient agreeable with plan.\par

## 2022-09-27 ENCOUNTER — NON-APPOINTMENT (OUTPATIENT)
Age: 72
End: 2022-09-27

## 2022-09-27 LAB
APPEARANCE: ABNORMAL
BACTERIA UR CULT: ABNORMAL
BILIRUBIN URINE: NEGATIVE
BLOOD URINE: NORMAL
COLOR: NORMAL
GLUCOSE QUALITATIVE U: NEGATIVE
KETONES URINE: NEGATIVE
LEUKOCYTE ESTERASE URINE: ABNORMAL
NITRITE URINE: NEGATIVE
PH URINE: 6
PROTEIN URINE: NEGATIVE
SPECIFIC GRAVITY URINE: 1.02
UROBILINOGEN URINE: NORMAL

## 2022-09-29 LAB — CYTOLOGY CVX/VAG DOC THIN PREP: NORMAL

## 2022-10-03 ENCOUNTER — RESULT CHARGE (OUTPATIENT)
Age: 72
End: 2022-10-03

## 2022-10-04 ENCOUNTER — APPOINTMENT (OUTPATIENT)
Dept: CARDIOLOGY | Facility: CLINIC | Age: 72
End: 2022-10-04

## 2022-10-04 ENCOUNTER — NON-APPOINTMENT (OUTPATIENT)
Age: 72
End: 2022-10-04

## 2022-10-04 VITALS — SYSTOLIC BLOOD PRESSURE: 146 MMHG | DIASTOLIC BLOOD PRESSURE: 76 MMHG

## 2022-10-04 VITALS
SYSTOLIC BLOOD PRESSURE: 144 MMHG | HEIGHT: 63 IN | HEART RATE: 59 BPM | WEIGHT: 170 LBS | OXYGEN SATURATION: 98 % | DIASTOLIC BLOOD PRESSURE: 70 MMHG | TEMPERATURE: 96.9 F | BODY MASS INDEX: 30.12 KG/M2 | RESPIRATION RATE: 14 BRPM

## 2022-10-04 PROCEDURE — 99214 OFFICE O/P EST MOD 30 MIN: CPT

## 2022-10-04 PROCEDURE — 93000 ELECTROCARDIOGRAM COMPLETE: CPT

## 2022-10-04 PROCEDURE — 93306 TTE W/DOPPLER COMPLETE: CPT

## 2022-10-04 NOTE — DISCUSSION/SUMMARY
[FreeTextEntry1] : CHRISTAL NGUYEN is a 72 year old F who presents today with the above history and the following active issues: \par \par Labile hypertension:  Slightly elevated in office today 146/76. Continue current meds. Home Bp readings lower. She will RTO with her BP cuff to check accuracy. \par Echo showing HTN changes with Mild LVH and Mod dilated LA. \par \par Hyperlipidemia: Tolerating statin therapy.  LDL: 92 From Sept 2022. \par -Trig 195- she would benefit from starting Fish oil supp\par - dietary changes discussed\par \par Avoid Demerol in future (suspect hypertensive blood pressure response to it)\par \par Exercise and weight loss recommended\par \par RTO for BP check then routinely in 6 months\par Routine 6 month follow up or sooner if needed \par Discussed red flag symptoms, which would warrant sooner or emergent medical evaluation.\par Any questions and concerns were addressed and resolved. \par \par Sincerely,\par \par Margarita Joiner ANP-C\par Patients history, testing, and plan reviewed with supervising MD: Dr. Messi Hahn \par  [EKG obtained to assist in diagnosis and management of assessed problem(s)] : EKG obtained to assist in diagnosis and management of assessed problem(s)

## 2022-10-04 NOTE — HISTORY OF PRESENT ILLNESS
[FreeTextEntry1] : Evelina is a pleasant 72-year-old female who is overweight, history of fatty liver and gallstones, HTN, HLD. She status post cholecystectomy.\par \par Today she presents to review her recent testing (Echo and labs). She is feeling well. She has been compliant with taking her BP medications. BP readings at home 100-110's mostly. However today she is 146/74. She denies chest pain, pressure, palpitations, LE edema, lightheadedness, dizziness, near syncope or syncope. \par \par She did have hypertensive urgency postoperatively in [facility] during recovery ( she believes it was secondary to inadvertent Demerol administration to which she is allergic).  \par \par She is quite active and does not have exertional chest pain.  History of rheumatic disease as a child? She has known about her systolic murmur for several decades

## 2022-10-04 NOTE — ASSESSMENT
[FreeTextEntry1] : Reviewed today:\par EKG 10/4/2022 SR 60bpm PRWP LAD Non-specific T wave abnormality\par Echo 10/4/2022: Preliminary report. EF >65%, Mild MR. Calcified aortic and mitral valve with normal opening. Mild AR. Moderately dilated LA. Hyperdynamic LV systolic function. Mild LVH. Mild MD/TR. Borderline Pulm pressures 32mmHg\par \par Labs: Total Chol 195 Trig 195 LDL 92 Creat 0.89 K+ 4.1 ast 16 Alt 11  Hgb 12.5 Hct 37.4 Plt 257\par Labs: 11/2021 Hgb 12.8 HCT 37.8 Plt 253 Creat 0.81 K+ 3.9 \par \par Reviewed previously\par - EKG 08/13/18: Sinus rhythm. PVC.\par - Labs July 2018: Normal LFT. Normal creatinine. FBS 97.\par - Ultrasound abdomen July 2018: Fatty liver, and uncomplicated cholelithiasis.\par - ETT 09/11/18: Exercise 6:47 minutes on Alfredo protocol. No angina. Equivocal EKG changes.\par - Stress echocardiogram October 2018: Exercise 7:33 minutes on Alfredo protocol. There were EKG changes but stress echocardiogram was negative for ischemia by wall motion. PASP increase to 54\par - Labs June 2019: . HDL 81. Triglycerides 168\par - Labs February 2020: LDL 94.  FBS 99.  Triglycerides 178.  Normal LFT and creatinine.  Normal CPK\par - Holter recording February 2021: Average heart rate 66.  Large amounts of artifact.  Rare PACs.  Asymptomatic ectopic atrial tachycardia 11 beats.  Rare PVCs.\par

## 2022-10-04 NOTE — PHYSICAL EXAM
[Normal] : clear lung fields, good air entry, no respiratory distress [Normal Gait] : normal gait [No Edema] : no edema [No Rash] : no rash [Alert and Oriented] : alert and oriented [Normal Speech] : normal speech [de-identified] : obese [de-identified] : 1/6 Murmur

## 2022-10-14 ENCOUNTER — APPOINTMENT (OUTPATIENT)
Dept: MAMMOGRAPHY | Facility: CLINIC | Age: 72
End: 2022-10-14

## 2022-10-14 ENCOUNTER — RESULT REVIEW (OUTPATIENT)
Age: 72
End: 2022-10-14

## 2022-10-14 PROCEDURE — 77067 SCR MAMMO BI INCL CAD: CPT

## 2022-10-14 PROCEDURE — 77063 BREAST TOMOSYNTHESIS BI: CPT

## 2022-10-17 ENCOUNTER — NON-APPOINTMENT (OUTPATIENT)
Age: 72
End: 2022-10-17

## 2022-10-19 ENCOUNTER — APPOINTMENT (OUTPATIENT)
Dept: CARDIOLOGY | Facility: CLINIC | Age: 72
End: 2022-10-19

## 2022-10-25 ENCOUNTER — APPOINTMENT (OUTPATIENT)
Dept: CARDIOLOGY | Facility: CLINIC | Age: 72
End: 2022-10-25

## 2022-10-25 ENCOUNTER — NON-APPOINTMENT (OUTPATIENT)
Age: 72
End: 2022-10-25

## 2022-11-08 ENCOUNTER — NON-APPOINTMENT (OUTPATIENT)
Age: 72
End: 2022-11-08

## 2022-12-19 ENCOUNTER — NON-APPOINTMENT (OUTPATIENT)
Age: 72
End: 2022-12-19

## 2023-04-03 ENCOUNTER — APPOINTMENT (OUTPATIENT)
Dept: CARDIOLOGY | Facility: CLINIC | Age: 73
End: 2023-04-03
Payer: MEDICARE

## 2023-04-03 VITALS
BODY MASS INDEX: 30.12 KG/M2 | HEART RATE: 72 BPM | OXYGEN SATURATION: 98 % | SYSTOLIC BLOOD PRESSURE: 136 MMHG | HEIGHT: 63 IN | DIASTOLIC BLOOD PRESSURE: 70 MMHG | WEIGHT: 170 LBS

## 2023-04-03 DIAGNOSIS — R94.39 ABNORMAL RESULT OF OTHER CARDIOVASCULAR FUNCTION STUDY: ICD-10-CM

## 2023-04-03 PROCEDURE — 99214 OFFICE O/P EST MOD 30 MIN: CPT

## 2023-04-03 NOTE — ASSESSMENT
[FreeTextEntry1] : Reviewed today:\par EKG 10/4/2022 SR 60bpm PRWP LAD Non-specific T wave abnormality\par Echo 10/4/2022: Preliminary report. EF >65%, Mild MR. Calcified aortic and mitral valve with normal opening. Mild AR. Moderately dilated LA. Hyperdynamic LV systolic function. Mild LVH. Mild NH/TR. Borderline Pulm pressures 32mmHg\par Labs in September 2022: Total Chol 195 Trig 195 LDL 92 Creat 0.89 K+ 4.1 ast 16 Alt 11  Hgb 12.5 Hct 37.4 Plt 257\par \par \par Reviewed previously\par - EKG 08/13/18: Sinus rhythm. PVC.\par - Labs July 2018: Normal LFT. Normal creatinine. FBS 97.\par - Ultrasound abdomen July 2018: Fatty liver, and uncomplicated cholelithiasis.\par - ETT 09/11/18: Exercise 6:47 minutes on Alfredo protocol. No angina. Equivocal EKG changes.\par - Stress echocardiogram October 2018: Exercise 7:33 minutes on Alfredo protocol. There were EKG changes but stress echocardiogram was negative for ischemia by wall motion. PASP increase to 54\par - Labs June 2019: . HDL 81. Triglycerides 168\par - Labs February 2020: LDL 94.  FBS 99.  Triglycerides 178.  Normal LFT and creatinine.  Normal CPK\par - Holter recording February 2021: Average heart rate 66.  Large amounts of artifact.  Rare PACs.  Asymptomatic ectopic atrial tachycardia 11 beats.  Rare PVCs.\par

## 2023-04-03 NOTE — PHYSICAL EXAM
[Normal] : clear lung fields, good air entry, no respiratory distress [Normal Gait] : normal gait [No Edema] : no edema [No Rash] : no rash [Normal Speech] : normal speech [Alert and Oriented] : alert and oriented [de-identified] : obese [de-identified] : 1/6 Murmur

## 2023-04-03 NOTE — DISCUSSION/SUMMARY
[FreeTextEntry1] : CHRISTAL NGUYEN is a 72 year old F who presents today with the above history and the following active issues: \par \par Labile hypertension:  Slightly elevated in office today 136/7. Continue current meds. Home Bp readings lower. \par \par Echo showing HTN changes with Mild LVH and Mod dilated LA. \par \par Hyperlipidemia: Tolerating statin therapy.  LDL: 92 From Sept 2022. \par -Trig 195- she would benefit from starting Fish oil supp\par - dietary changes discussed\par -Labs in 6 months\par \par Avoid Demerol in future (suspect hypertensive blood pressure response to it)\par \par Exercise and weight loss recommended\par \par Thank you for this referral and allowing me to participate in the care of this patient.  If I can be of any further help or  if you have any questions, please do not hesitate to contact me\par \par \par Sincerely,\par \par Jorge Rich MD, FACC, [place]\par \par

## 2023-04-03 NOTE — HISTORY OF PRESENT ILLNESS
[FreeTextEntry1] : Evelina is a pleasant 72-year-old female who is overweight, history of fatty liver and gallstones, HTN, HLD. She status post cholecystectomy.\par \par She is feeling well. She has been compliant with taking her BP medications. BP readings at home 100-110's mostly. However today she is 146/74. She denies chest pain, pressure, palpitations, LE edema, lightheadedness, dizziness, near syncope or syncope. \par \par She did have hypertensive urgency postoperatively in [facility] during recovery ( she believes it was secondary to inadvertent Demerol administration to which she is allergic).  \par \par She is quite active and does not have exertional chest pain.  History of rheumatic disease as a child? She has known about her systolic murmur for several decades; it has not changed and she is asymptomatic

## 2023-09-26 ENCOUNTER — APPOINTMENT (OUTPATIENT)
Dept: OBGYN | Facility: CLINIC | Age: 73
End: 2023-09-26
Payer: MEDICARE

## 2023-09-26 ENCOUNTER — RESULT CHARGE (OUTPATIENT)
Age: 73
End: 2023-09-26

## 2023-09-26 VITALS
HEART RATE: 67 BPM | DIASTOLIC BLOOD PRESSURE: 80 MMHG | WEIGHT: 165 LBS | SYSTOLIC BLOOD PRESSURE: 175 MMHG | HEIGHT: 62.5 IN | BODY MASS INDEX: 29.6 KG/M2

## 2023-09-26 DIAGNOSIS — Z00.00 ENCOUNTER FOR GENERAL ADULT MEDICAL EXAMINATION W/OUT ABNORMAL FINDINGS: ICD-10-CM

## 2023-09-26 LAB
BILIRUB UR QL STRIP: NORMAL
CLARITY UR: CLEAR
COLLECTION METHOD: NORMAL
GLUCOSE UR-MCNC: NORMAL
HCG UR QL: 0 EU/DL
HEMOGLOBIN: 11.4
HGB UR QL STRIP.AUTO: NORMAL
KETONES UR-MCNC: NORMAL
LEUKOCYTE ESTERASE UR QL STRIP: NORMAL
NITRITE UR QL STRIP: NORMAL
PH UR STRIP: 5
PROT UR STRIP-MCNC: NORMAL
SP GR UR STRIP: 1

## 2023-09-26 PROCEDURE — 85018 HEMOGLOBIN: CPT | Mod: QW

## 2023-09-26 PROCEDURE — 81003 URINALYSIS AUTO W/O SCOPE: CPT | Mod: QW

## 2023-09-26 PROCEDURE — G0101: CPT

## 2023-10-09 ENCOUNTER — NON-APPOINTMENT (OUTPATIENT)
Age: 73
End: 2023-10-09

## 2023-10-10 LAB — CYTOLOGY CVX/VAG DOC THIN PREP: NORMAL

## 2023-10-17 ENCOUNTER — APPOINTMENT (OUTPATIENT)
Dept: MAMMOGRAPHY | Facility: CLINIC | Age: 73
End: 2023-10-17
Payer: MEDICARE

## 2023-10-17 ENCOUNTER — NON-APPOINTMENT (OUTPATIENT)
Age: 73
End: 2023-10-17

## 2023-10-17 ENCOUNTER — RESULT REVIEW (OUTPATIENT)
Age: 73
End: 2023-10-17

## 2023-10-17 PROCEDURE — 77063 BREAST TOMOSYNTHESIS BI: CPT

## 2023-10-17 PROCEDURE — 77067 SCR MAMMO BI INCL CAD: CPT

## 2023-11-14 ENCOUNTER — APPOINTMENT (OUTPATIENT)
Dept: CARDIOLOGY | Facility: CLINIC | Age: 73
End: 2023-11-14
Payer: MEDICARE

## 2023-11-14 VITALS
OXYGEN SATURATION: 98 % | HEART RATE: 64 BPM | HEIGHT: 62.5 IN | SYSTOLIC BLOOD PRESSURE: 124 MMHG | WEIGHT: 167 LBS | BODY MASS INDEX: 29.96 KG/M2 | RESPIRATION RATE: 14 BRPM | DIASTOLIC BLOOD PRESSURE: 62 MMHG

## 2023-11-14 DIAGNOSIS — K76.0 FATTY (CHANGE OF) LIVER, NOT ELSEWHERE CLASSIFIED: ICD-10-CM

## 2023-11-14 DIAGNOSIS — I49.3 VENTRICULAR PREMATURE DEPOLARIZATION: ICD-10-CM

## 2023-11-14 DIAGNOSIS — E11.9 TYPE 2 DIABETES MELLITUS W/OUT COMPLICATIONS: ICD-10-CM

## 2023-11-14 DIAGNOSIS — E66.3 OVERWEIGHT: ICD-10-CM

## 2023-11-14 DIAGNOSIS — I10 ESSENTIAL (PRIMARY) HYPERTENSION: ICD-10-CM

## 2023-11-14 DIAGNOSIS — E78.5 HYPERLIPIDEMIA, UNSPECIFIED: ICD-10-CM

## 2023-11-14 PROCEDURE — 99214 OFFICE O/P EST MOD 30 MIN: CPT

## 2023-11-14 PROCEDURE — 93000 ELECTROCARDIOGRAM COMPLETE: CPT

## 2023-11-14 RX ORDER — ESTRADIOL 1 MG/1
1 TABLET ORAL
Refills: 0 | Status: DISCONTINUED | COMMUNITY
End: 2023-11-14

## 2023-12-05 RX ORDER — OLMESARTAN MEDOXOMIL 20 MG/1
20 TABLET, FILM COATED ORAL DAILY
Qty: 90 | Refills: 3 | Status: ACTIVE | COMMUNITY
Start: 2020-03-02 | End: 1900-01-01

## 2023-12-14 RX ORDER — METOPROLOL SUCCINATE 50 MG/1
50 TABLET, EXTENDED RELEASE ORAL
Qty: 90 | Refills: 2 | Status: ACTIVE | COMMUNITY
Start: 2018-11-06 | End: 1900-01-01

## 2023-12-20 RX ORDER — ESTRADIOL 1 MG/1
1 TABLET ORAL
Qty: 90 | Refills: 3 | Status: ACTIVE | COMMUNITY
Start: 2022-09-21

## 2024-07-08 ENCOUNTER — APPOINTMENT (OUTPATIENT)
Dept: CARDIOLOGY | Facility: CLINIC | Age: 74
End: 2024-07-08

## 2024-07-08 VITALS
HEIGHT: 62.5 IN | WEIGHT: 161 LBS | OXYGEN SATURATION: 100 % | SYSTOLIC BLOOD PRESSURE: 118 MMHG | DIASTOLIC BLOOD PRESSURE: 64 MMHG | RESPIRATION RATE: 14 BRPM | HEART RATE: 69 BPM | BODY MASS INDEX: 28.89 KG/M2

## 2024-07-08 DIAGNOSIS — I49.3 VENTRICULAR PREMATURE DEPOLARIZATION: ICD-10-CM

## 2024-07-08 DIAGNOSIS — E78.5 HYPERLIPIDEMIA, UNSPECIFIED: ICD-10-CM

## 2024-07-08 DIAGNOSIS — I10 ESSENTIAL (PRIMARY) HYPERTENSION: ICD-10-CM

## 2024-07-08 DIAGNOSIS — E11.9 TYPE 2 DIABETES MELLITUS W/OUT COMPLICATIONS: ICD-10-CM

## 2024-07-08 DIAGNOSIS — E66.3 OVERWEIGHT: ICD-10-CM

## 2024-07-08 DIAGNOSIS — R94.39 ABNORMAL RESULT OF OTHER CARDIOVASCULAR FUNCTION STUDY: ICD-10-CM

## 2024-07-08 DIAGNOSIS — K76.0 FATTY (CHANGE OF) LIVER, NOT ELSEWHERE CLASSIFIED: ICD-10-CM

## 2024-07-08 PROCEDURE — 99213 OFFICE O/P EST LOW 20 MIN: CPT

## 2024-09-25 PROBLEM — Z12.4 CERVICAL CANCER SCREENING: Status: ACTIVE | Noted: 2024-09-25

## 2024-09-25 PROBLEM — Z12.11 COLON CANCER SCREENING: Status: ACTIVE | Noted: 2024-09-25

## 2024-10-02 ENCOUNTER — APPOINTMENT (OUTPATIENT)
Dept: OBGYN | Facility: CLINIC | Age: 74
End: 2024-10-02
Payer: MEDICARE

## 2024-10-02 VITALS
DIASTOLIC BLOOD PRESSURE: 80 MMHG | HEART RATE: 60 BPM | SYSTOLIC BLOOD PRESSURE: 162 MMHG | BODY MASS INDEX: 27.82 KG/M2 | HEIGHT: 63 IN | WEIGHT: 157 LBS

## 2024-10-02 DIAGNOSIS — Z12.11 ENCOUNTER FOR SCREENING FOR MALIGNANT NEOPLASM OF COLON: ICD-10-CM

## 2024-10-02 DIAGNOSIS — N95.2 POSTMENOPAUSAL ATROPHIC VAGINITIS: ICD-10-CM

## 2024-10-02 DIAGNOSIS — Z12.4 ENCOUNTER FOR SCREENING FOR MALIGNANT NEOPLASM OF CERVIX: ICD-10-CM

## 2024-10-02 DIAGNOSIS — Z12.31 ENCOUNTER FOR SCREENING MAMMOGRAM FOR MALIGNANT NEOPLASM OF BREAST: ICD-10-CM

## 2024-10-02 DIAGNOSIS — Z00.00 ENCOUNTER FOR GENERAL ADULT MEDICAL EXAMINATION W/OUT ABNORMAL FINDINGS: ICD-10-CM

## 2024-10-02 DIAGNOSIS — Z01.419 ENCOUNTER FOR GYNECOLOGICAL EXAMINATION (GENERAL) (ROUTINE) W/OUT ABNORMAL FINDINGS: ICD-10-CM

## 2024-10-02 LAB
BILIRUB UR QL STRIP: NEGATIVE
CLARITY UR: CLEAR
COLLECTION METHOD: NORMAL
GLUCOSE UR-MCNC: NEGATIVE
HCG UR QL: 0 EU/DL
HEMOGLOBIN: 11.8
HGB UR QL STRIP.AUTO: NORMAL
KETONES UR-MCNC: NEGATIVE
LEUKOCYTE ESTERASE UR QL STRIP: NEGATIVE
NITRITE UR QL STRIP: NEGATIVE
PH UR STRIP: 5
PROT UR STRIP-MCNC: NEGATIVE
SP GR UR STRIP: 1

## 2024-10-02 PROCEDURE — 82270 OCCULT BLOOD FECES: CPT

## 2024-10-02 PROCEDURE — 81003 URINALYSIS AUTO W/O SCOPE: CPT | Mod: QW

## 2024-10-02 PROCEDURE — 85018 HEMOGLOBIN: CPT | Mod: QW

## 2024-10-02 PROCEDURE — G0101: CPT

## 2024-10-02 NOTE — COUNSELING
Reason for call: Request for results.    Name of test or procedure: Bone test    Date of test or procedure: unknown    Location of test or procedure: [facility]    Preferred method for responding to this message: Telephone Call    Phone number patient can be reached at: Home number on file [phone] (home)    If we can't reach you directly, may we leave a detailed response at the number you provided?No      Patient requests a call back from Lula Gibson on 5/9/2024 at 9:43 AM       [Nutrition/ Exercise/ Weight Management] : nutrition, exercise, weight management [Vitamins/Supplements] : vitamins/supplements [Breast Self Exam] : breast self exam

## 2024-10-07 NOTE — HISTORY OF PRESENT ILLNESS
[FreeTextEntry1] : Patient is a 75 yo female here today for annual visit. She denies any GYN complaints at this time. on estradiol, tolerating well   hx of TLH

## 2024-10-07 NOTE — PLAN
[FreeTextEntry1] : Patient to follow up in 1 year for annual GYN exam cont estradiol, rx sent  Mammogram due: now Colonoscopy due: 9/27  Bone density due: 7/26 Pap ordered Hemoccult ordered All questions answered, patient agreeable with plan   I Joellen Hebert [facility] am scribing for the presence of Dr. Langford the following sections HISTORY OF PRESENT ILLNESS, PAST MEDICAL/FAMILY/SOCIAL HISTORY; REVIEW OF SYSTEMS; VITAL SIGNS; PHYSICAL EXAM; DISPOSITION. I personally performed the services described in the documentation, reviewed the documentation recorded by the scribe in my presence and it accurately and completely records my words and actions.

## 2024-10-07 NOTE — PHYSICAL EXAM
[Chaperone Present] : A chaperone was present in the examining room during all aspects of the physical examination [Appropriately responsive] : appropriately responsive [Alert] : alert [No Acute Distress] : no acute distress [No Lymphadenopathy] : no lymphadenopathy [Soft] : soft [Non-tender] : non-tender [Non-distended] : non-distended [No HSM] : No HSM [No Lesions] : no lesions [No Mass] : no mass [Oriented x3] : oriented x3 [Examination Of The Breasts] : a normal appearance [No Masses] : no breast masses were palpable [Labia Majora] : normal [Labia Minora] : normal [Atrophy] : atrophy [Absent] : absent [Normal rectal exam] : was normal [Normal Brown Stool] : was normal and brown [Normal] : was normal [None] : there was no rectal mass  [FreeTextEntry2] : Rossi FNP-BC [Occult Blood Positive] : was negative for occult blood analysis [Internal Hemorrhoid] : no internal hemorrhoids were present [External Hemorrhoid] : no external hemorrhoids were present [Skin Tags] : no residual hemorrhoidal skin tags

## 2024-10-07 NOTE — HISTORY OF PRESENT ILLNESS
[FreeTextEntry1] : Patient is a 73 yo female here today for annual visit. She denies any GYN complaints at this time. on estradiol, tolerating well   hx of TLH

## 2024-10-07 NOTE — PLAN
[FreeTextEntry1] : Patient to follow up in 1 year for annual GYN exam cont estradiol, rx sent  Mammogram due: now Colonoscopy due: 9/27  Bone density due: 7/26 Pap ordered Hemoccult ordered All questions answered, patient agreeable with plan   I Joellen Hebert [facility] am scribing for the presence of Dr. Langford the following sections HISTORY OF PRESENT ILLNESS, PAST MEDICAL/FAMILY/SOCIAL HISTORY; REVIEW OF SYSTEMS; VITAL SIGNS; PHYSICAL EXAM; DISPOSITION. I personally performed the services described in the documentation, reviewed the documentation recorded by the scribe in my presence and it accurately and completely records my words and actions.

## 2024-10-08 ENCOUNTER — RESULT REVIEW (OUTPATIENT)
Age: 74
End: 2024-10-08

## 2024-10-08 ENCOUNTER — NON-APPOINTMENT (OUTPATIENT)
Age: 74
End: 2024-10-08

## 2024-10-08 ENCOUNTER — APPOINTMENT (OUTPATIENT)
Dept: MAMMOGRAPHY | Facility: CLINIC | Age: 74
End: 2024-10-08
Payer: MEDICARE

## 2024-10-08 DIAGNOSIS — R92.8 OTHER ABNORMAL AND INCONCLUSIVE FINDINGS ON DIAGNOSTIC IMAGING OF BREAST: ICD-10-CM

## 2024-10-08 LAB — CYTOLOGY CVX/VAG DOC THIN PREP: NORMAL

## 2024-10-08 PROCEDURE — 77063 BREAST TOMOSYNTHESIS BI: CPT

## 2024-10-08 PROCEDURE — 77067 SCR MAMMO BI INCL CAD: CPT

## 2024-10-17 ENCOUNTER — APPOINTMENT (OUTPATIENT)
Dept: ULTRASOUND IMAGING | Facility: CLINIC | Age: 74
End: 2024-10-17
Payer: MEDICARE

## 2024-10-17 ENCOUNTER — RESULT REVIEW (OUTPATIENT)
Age: 74
End: 2024-10-17

## 2024-10-17 ENCOUNTER — APPOINTMENT (OUTPATIENT)
Dept: MAMMOGRAPHY | Facility: CLINIC | Age: 74
End: 2024-10-17
Payer: MEDICARE

## 2024-10-17 PROCEDURE — 77065 DX MAMMO INCL CAD UNI: CPT | Mod: LT

## 2024-10-17 PROCEDURE — G0279: CPT | Mod: LT

## 2024-10-17 PROCEDURE — 76642 ULTRASOUND BREAST LIMITED: CPT | Mod: LT

## 2024-12-09 ENCOUNTER — RX RENEWAL (OUTPATIENT)
Age: 74
End: 2024-12-09

## 2025-01-21 ENCOUNTER — APPOINTMENT (OUTPATIENT)
Dept: CARDIOLOGY | Facility: CLINIC | Age: 75
End: 2025-01-21
Payer: MEDICARE

## 2025-01-21 VITALS
HEIGHT: 63 IN | RESPIRATION RATE: 14 BRPM | WEIGHT: 162 LBS | OXYGEN SATURATION: 100 % | BODY MASS INDEX: 28.7 KG/M2 | HEART RATE: 70 BPM | DIASTOLIC BLOOD PRESSURE: 58 MMHG | SYSTOLIC BLOOD PRESSURE: 128 MMHG

## 2025-01-21 DIAGNOSIS — I49.3 VENTRICULAR PREMATURE DEPOLARIZATION: ICD-10-CM

## 2025-01-21 DIAGNOSIS — E78.5 HYPERLIPIDEMIA, UNSPECIFIED: ICD-10-CM

## 2025-01-21 DIAGNOSIS — K76.0 FATTY (CHANGE OF) LIVER, NOT ELSEWHERE CLASSIFIED: ICD-10-CM

## 2025-01-21 DIAGNOSIS — E11.9 TYPE 2 DIABETES MELLITUS W/OUT COMPLICATIONS: ICD-10-CM

## 2025-01-21 DIAGNOSIS — E66.3 OVERWEIGHT: ICD-10-CM

## 2025-01-21 DIAGNOSIS — I10 ESSENTIAL (PRIMARY) HYPERTENSION: ICD-10-CM

## 2025-01-21 DIAGNOSIS — R94.39 ABNORMAL RESULT OF OTHER CARDIOVASCULAR FUNCTION STUDY: ICD-10-CM

## 2025-01-21 PROCEDURE — G2211 COMPLEX E/M VISIT ADD ON: CPT

## 2025-01-21 PROCEDURE — 93000 ELECTROCARDIOGRAM COMPLETE: CPT

## 2025-01-21 PROCEDURE — 99213 OFFICE O/P EST LOW 20 MIN: CPT

## 2025-06-07 ENCOUNTER — NON-APPOINTMENT (OUTPATIENT)
Age: 75
End: 2025-06-07

## 2025-06-09 ENCOUNTER — APPOINTMENT (OUTPATIENT)
Dept: CARDIOLOGY | Facility: CLINIC | Age: 75
End: 2025-06-09
Payer: MEDICARE

## 2025-06-09 PROCEDURE — 76376 3D RENDER W/INTRP POSTPROCES: CPT

## 2025-06-09 PROCEDURE — 93306 TTE W/DOPPLER COMPLETE: CPT

## 2025-07-18 ENCOUNTER — APPOINTMENT (OUTPATIENT)
Dept: CARDIOLOGY | Facility: CLINIC | Age: 75
End: 2025-07-18
Payer: MEDICARE

## 2025-07-18 VITALS
WEIGHT: 158 LBS | SYSTOLIC BLOOD PRESSURE: 116 MMHG | DIASTOLIC BLOOD PRESSURE: 50 MMHG | BODY MASS INDEX: 27.99 KG/M2 | OXYGEN SATURATION: 93 % | HEART RATE: 72 BPM

## 2025-07-18 PROCEDURE — G2211 COMPLEX E/M VISIT ADD ON: CPT

## 2025-07-18 PROCEDURE — 99213 OFFICE O/P EST LOW 20 MIN: CPT
